# Patient Record
Sex: MALE | Race: WHITE | NOT HISPANIC OR LATINO | Employment: PART TIME | URBAN - METROPOLITAN AREA
[De-identification: names, ages, dates, MRNs, and addresses within clinical notes are randomized per-mention and may not be internally consistent; named-entity substitution may affect disease eponyms.]

---

## 2018-07-02 ENCOUNTER — HOSPITAL ENCOUNTER (INPATIENT)
Facility: HOSPITAL | Age: 28
LOS: 2 days | Discharge: HOME | End: 2018-07-04
Attending: EMERGENCY MEDICINE | Admitting: SURGERY

## 2018-07-02 ENCOUNTER — APPOINTMENT (OUTPATIENT)
Dept: ULTRASOUND IMAGING | Facility: HOSPITAL | Age: 28
End: 2018-07-02

## 2018-07-02 DIAGNOSIS — K81.9 CHOLECYSTITIS: Primary | ICD-10-CM

## 2018-07-02 LAB
ALBUMIN SERPL-MCNC: 4.8 G/DL (ref 3.5–5.2)
ALBUMIN/GLOB SERPL: 2 G/DL
ALP SERPL-CCNC: 59 U/L (ref 39–117)
ALT SERPL W P-5'-P-CCNC: 37 U/L (ref 1–41)
ANION GAP SERPL CALCULATED.3IONS-SCNC: 12.5 MMOL/L
AST SERPL-CCNC: 27 U/L (ref 1–40)
BASOPHILS # BLD AUTO: 0.05 10*3/MM3 (ref 0–0.2)
BASOPHILS NFR BLD AUTO: 0.4 % (ref 0–1.5)
BILIRUB SERPL-MCNC: 0.8 MG/DL (ref 0.1–1.2)
BILIRUB UR QL STRIP: NEGATIVE
BUN BLD-MCNC: 17 MG/DL (ref 6–20)
BUN/CREAT SERPL: 15.6 (ref 7–25)
CALCIUM SPEC-SCNC: 9 MG/DL (ref 8.6–10.5)
CHLORIDE SERPL-SCNC: 101 MMOL/L (ref 98–107)
CLARITY UR: ABNORMAL
CO2 SERPL-SCNC: 25.5 MMOL/L (ref 22–29)
COLOR UR: YELLOW
CREAT BLD-MCNC: 1.09 MG/DL (ref 0.76–1.27)
DEPRECATED RDW RBC AUTO: 40.7 FL (ref 37–54)
EOSINOPHIL # BLD AUTO: 0.69 10*3/MM3 (ref 0–0.7)
EOSINOPHIL NFR BLD AUTO: 5.9 % (ref 0.3–6.2)
ERYTHROCYTE [DISTWIDTH] IN BLOOD BY AUTOMATED COUNT: 12.1 % (ref 11.5–14.5)
GFR SERPL CREATININE-BSD FRML MDRD: 81 ML/MIN/1.73
GLOBULIN UR ELPH-MCNC: 2.4 GM/DL
GLUCOSE BLD-MCNC: 88 MG/DL (ref 65–99)
GLUCOSE UR STRIP-MCNC: NEGATIVE MG/DL
HCT VFR BLD AUTO: 47.7 % (ref 40.4–52.2)
HGB BLD-MCNC: 16.7 G/DL (ref 13.7–17.6)
HGB UR QL STRIP.AUTO: NEGATIVE
HOLD SPECIMEN: NORMAL
HOLD SPECIMEN: NORMAL
IMM GRANULOCYTES # BLD: 0.03 10*3/MM3 (ref 0–0.03)
IMM GRANULOCYTES NFR BLD: 0.3 % (ref 0–0.5)
KETONES UR QL STRIP: NEGATIVE
LEUKOCYTE ESTERASE UR QL STRIP.AUTO: NEGATIVE
LIPASE SERPL-CCNC: 21 U/L (ref 13–60)
LYMPHOCYTES # BLD AUTO: 1.81 10*3/MM3 (ref 0.9–4.8)
LYMPHOCYTES NFR BLD AUTO: 15.6 % (ref 19.6–45.3)
MCH RBC QN AUTO: 32.1 PG (ref 27–32.7)
MCHC RBC AUTO-ENTMCNC: 35 G/DL (ref 32.6–36.4)
MCV RBC AUTO: 91.6 FL (ref 79.8–96.2)
MONOCYTES # BLD AUTO: 0.89 10*3/MM3 (ref 0.2–1.2)
MONOCYTES NFR BLD AUTO: 7.7 % (ref 5–12)
NEUTROPHILS # BLD AUTO: 8.17 10*3/MM3 (ref 1.9–8.1)
NEUTROPHILS NFR BLD AUTO: 70.4 % (ref 42.7–76)
NITRITE UR QL STRIP: NEGATIVE
PH UR STRIP.AUTO: <=5 [PH] (ref 5–8)
PLATELET # BLD AUTO: 266 10*3/MM3 (ref 140–500)
PMV BLD AUTO: 9.8 FL (ref 6–12)
POTASSIUM BLD-SCNC: 4.2 MMOL/L (ref 3.5–5.2)
PROT SERPL-MCNC: 7.2 G/DL (ref 6–8.5)
PROT UR QL STRIP: NEGATIVE
RBC # BLD AUTO: 5.21 10*6/MM3 (ref 4.6–6)
SODIUM BLD-SCNC: 139 MMOL/L (ref 136–145)
SP GR UR STRIP: 1.03 (ref 1–1.03)
UROBILINOGEN UR QL STRIP: ABNORMAL
WBC NRBC COR # BLD: 11.61 10*3/MM3 (ref 4.5–10.7)
WHOLE BLOOD HOLD SPECIMEN: NORMAL
WHOLE BLOOD HOLD SPECIMEN: NORMAL

## 2018-07-02 PROCEDURE — 76705 ECHO EXAM OF ABDOMEN: CPT

## 2018-07-02 PROCEDURE — 99222 1ST HOSP IP/OBS MODERATE 55: CPT | Performed by: SURGERY

## 2018-07-02 PROCEDURE — 80053 COMPREHEN METABOLIC PANEL: CPT | Performed by: PHYSICIAN ASSISTANT

## 2018-07-02 PROCEDURE — 85025 COMPLETE CBC W/AUTO DIFF WBC: CPT | Performed by: PHYSICIAN ASSISTANT

## 2018-07-02 PROCEDURE — 99284 EMERGENCY DEPT VISIT MOD MDM: CPT

## 2018-07-02 PROCEDURE — 83690 ASSAY OF LIPASE: CPT | Performed by: PHYSICIAN ASSISTANT

## 2018-07-02 PROCEDURE — G0378 HOSPITAL OBSERVATION PER HR: HCPCS

## 2018-07-02 PROCEDURE — 25010000002 PIPERACILLIN SOD-TAZOBACTAM PER 1 G: Performed by: EMERGENCY MEDICINE

## 2018-07-02 PROCEDURE — 81003 URINALYSIS AUTO W/O SCOPE: CPT | Performed by: PHYSICIAN ASSISTANT

## 2018-07-02 RX ORDER — SODIUM CHLORIDE, SODIUM LACTATE, POTASSIUM CHLORIDE, CALCIUM CHLORIDE 600; 310; 30; 20 MG/100ML; MG/100ML; MG/100ML; MG/100ML
125 INJECTION, SOLUTION INTRAVENOUS CONTINUOUS
Status: DISCONTINUED | OUTPATIENT
Start: 2018-07-02 | End: 2018-07-03

## 2018-07-02 RX ORDER — ONDANSETRON 2 MG/ML
4 INJECTION INTRAMUSCULAR; INTRAVENOUS EVERY 6 HOURS PRN
Status: DISCONTINUED | OUTPATIENT
Start: 2018-07-02 | End: 2018-07-04 | Stop reason: HOSPADM

## 2018-07-02 RX ORDER — ALUMINA, MAGNESIA, AND SIMETHICONE 2400; 2400; 240 MG/30ML; MG/30ML; MG/30ML
15 SUSPENSION ORAL ONCE
Status: COMPLETED | OUTPATIENT
Start: 2018-07-02 | End: 2018-07-02

## 2018-07-02 RX ORDER — NALOXONE HCL 0.4 MG/ML
0.4 VIAL (ML) INJECTION
Status: DISCONTINUED | OUTPATIENT
Start: 2018-07-02 | End: 2018-07-04 | Stop reason: HOSPADM

## 2018-07-02 RX ORDER — SODIUM CHLORIDE 0.9 % (FLUSH) 0.9 %
1-10 SYRINGE (ML) INJECTION AS NEEDED
Status: DISCONTINUED | OUTPATIENT
Start: 2018-07-02 | End: 2018-07-04 | Stop reason: HOSPADM

## 2018-07-02 RX ORDER — ALUMINA, MAGNESIA, AND SIMETHICONE 2400; 2400; 240 MG/30ML; MG/30ML; MG/30ML
15 SUSPENSION ORAL ONCE
Status: DISCONTINUED | OUTPATIENT
Start: 2018-07-02 | End: 2018-07-02

## 2018-07-02 RX ADMIN — ALUMINUM HYDROXIDE, MAGNESIUM HYDROXIDE, AND DIMETHICONE 15 ML: 400; 400; 40 SUSPENSION ORAL at 14:18

## 2018-07-02 RX ADMIN — SODIUM CHLORIDE, POTASSIUM CHLORIDE, SODIUM LACTATE AND CALCIUM CHLORIDE 125 ML/HR: 600; 310; 30; 20 INJECTION, SOLUTION INTRAVENOUS at 20:14

## 2018-07-02 RX ADMIN — LIDOCAINE HYDROCHLORIDE 15 ML: 20 SOLUTION ORAL; TOPICAL at 14:18

## 2018-07-02 RX ADMIN — TAZOBACTAM SODIUM AND PIPERACILLIN SODIUM 4.5 G: 500; 4 INJECTION, SOLUTION INTRAVENOUS at 16:08

## 2018-07-02 NOTE — ED TRIAGE NOTES
Pt reports epigastric pain that began when he woke up at 7:30 this morning. He denies N/V/D. He reports a similar episode of abdominal pain last Monday.

## 2018-07-02 NOTE — H&P
Patient Care Team:  No Known Provider as PCP - General    Chief complaint:  Abdominal pain    Subjective     History of Present Illness     The patient is a very pleasant 28-year-old white male who presented to the emergency room with abdominal pain.  He developed fairly severe epigastric abdominal pain last night after eating hot dogs for dinner.  He had no nausea or vomiting.  He has not had any diarrhea or constipation.  He had similar symptoms about one week ago after eating pizza but those symptoms were more mild and resolved spontaneously.  About a year ago he had upper abdominal pain after eating bratwurst and was diagnosed with gas pain and sought no further medical attention.  He presented to the emergency room because his present symptoms were much more severe than previous symptoms and a right upper quadrant ultrasound showed gallstones with a thickened gallbladder wall and pericholecystic fluid consistent with acute cholecystitis.    Review of Systems   Constitutional: Negative for activity change, appetite change, fatigue and fever.   HENT: Negative for trouble swallowing and voice change.    Respiratory: Negative for chest tightness and shortness of breath.    Cardiovascular: Negative for chest pain and palpitations.   Gastrointestinal: Positive for abdominal pain. Negative for blood in stool, constipation, diarrhea, nausea and vomiting.   Endocrine: Negative for cold intolerance and heat intolerance.   Genitourinary: Negative for dysuria and flank pain.   Neurological: Negative for dizziness and light-headedness.   Hematological: Negative for adenopathy. Does not bruise/bleed easily.   Psychiatric/Behavioral: Negative for agitation and confusion.        History reviewed. No pertinent past medical history.  History reviewed. No pertinent surgical history.  History reviewed. No pertinent family history.  Social History   Substance Use Topics   • Smoking status: Never Smoker   • Smokeless tobacco:  Not on file   • Alcohol use Yes      Comment: twice weekly     Allergies:  Patient has no known allergies.    Objective      Vital Signs  Temp:  [97.7 °F (36.5 °C)-98.3 °F (36.8 °C)] 97.7 °F (36.5 °C)  Heart Rate:  [60-79] 67  Resp:  [16-20] 16  BP: (112-148)/(73-98) 112/76    Physical Exam   Constitutional: He is oriented to person, place, and time. He appears well-developed and well-nourished.  Non-toxic appearance. No distress.   HENT:   Head: Normocephalic and atraumatic.   Eyes: EOM are normal. No scleral icterus. Right eye exhibits normal extraocular motion. Left eye exhibits normal extraocular motion.   Neck: Normal range of motion. Neck supple. No JVD present. No tracheal deviation present.   Cardiovascular: Normal rate and regular rhythm.    Pulmonary/Chest: Effort normal and breath sounds normal. No respiratory distress. He exhibits no tenderness.   Abdominal: Soft. Normal appearance and bowel sounds are normal. He exhibits no distension. There is no hepatosplenomegaly. There is tenderness (Moderately tender) in the right upper quadrant. There is no rebound and no guarding. No hernia.   Neurological: He is alert and oriented to person, place, and time.   Skin: Skin is warm and dry.   Psychiatric: He has a normal mood and affect. His behavior is normal. Judgment and thought content normal.       Results Review:   I reviewed the patient's new clinical results.      Assessment/Plan     Active Problems:    Cholecystitis      Assessment & Plan     1.  Cholelithiasis with acute cholecystitis: Plan to admit and proceed with a laparoscopic cholecystectomy with intraoperative cholangiogram.  The patient understands the indications, alternatives, risks, and benefits of the procedure and wishes to proceed.    I discussed the patients findings and my recommendations with patient    Kofi Lora Jr., MD  07/02/18  6:58 PM

## 2018-07-02 NOTE — ED NOTES
Attempted to call report. Placed on hold for five minutes. Will attempt to call again in 15 minutes.      Mahnaz Fritz RN  07/02/18 5553

## 2018-07-02 NOTE — ED PROVIDER NOTES
" EMERGENCY DEPARTMENT ENCOUNTER    Room Number:  29/29  Date seen:  7/2/2018  Time seen: 2:26 PM  PCP: No Known Provider  Historian: Patient, Family      HPI:  Chief Complaint: Abdominal Pain  Context: Tyrone Carvajal is a 28 y.o. male who presents to the ED c/o constant epigastric abdominal pain onset earlier this AM. Pt states that his abdominal pain will worsen shortly after food intake. Pt reports that he has taken ibuprofen with improvement of his abdominal pain. Pt denies chest pain, dyspnea, vomiting, diarrhea, constipation, and dysuria. However, pt has had mild nausea. Pt reports that he had a similar episode of abdominal pain last week for which pt took ibuprofen, which resolved pt's abdominal pain transiently. There are no other complaints at this time.     Pain Location: Epigastrium  Radiation: None  Quality: \"aching\"  Intensity/Severity: Moderate  Duration: Onset this AM  Onset quality: Gradual in onset  Timing: Constant  Progression: Improving  Aggravating Factors: Food intake  Alleviating Factors: Ibuprofen  Previous Episodes: One episode last week  Treatment before arrival: Ibuprofen  Associated Symptoms: Nausea (mild)          PAST MEDICAL HISTORY  Active Ambulatory Problems     Diagnosis Date Noted   • No Active Ambulatory Problems     Resolved Ambulatory Problems     Diagnosis Date Noted   • No Resolved Ambulatory Problems     No Additional Past Medical History         PAST SURGICAL HISTORY  History reviewed. No pertinent surgical history.      FAMILY HISTORY  History reviewed. No pertinent family history.      SOCIAL HISTORY  Social History     Social History   • Marital status: Single     Spouse name: N/A   • Number of children: N/A   • Years of education: N/A     Occupational History   • Not on file.     Social History Main Topics   • Smoking status: Never Smoker   • Smokeless tobacco: Not on file   • Alcohol use Yes      Comment: twice weekly   • Drug use: No   • Sexual activity: Not on file "     Other Topics Concern   • Not on file     Social History Narrative   • No narrative on file         ALLERGIES  Patient has no known allergies.        REVIEW OF SYSTEMS  Review of Systems   Constitutional: Negative for chills.   HENT: Negative for congestion, rhinorrhea and sore throat.    Eyes: Negative for pain.   Respiratory: Negative for cough and shortness of breath.    Cardiovascular: Negative for chest pain, palpitations and leg swelling.   Gastrointestinal: Positive for abdominal pain (improving) and nausea (mild). Negative for constipation, diarrhea and vomiting.   Genitourinary: Negative for difficulty urinating, dysuria, flank pain and frequency.   Musculoskeletal: Negative for myalgias, neck pain and neck stiffness.   Skin: Negative for rash.   Neurological: Negative for dizziness, speech difficulty, weakness, light-headedness, numbness and headaches.   Psychiatric/Behavioral: Negative.    All other systems reviewed and are negative.           PHYSICAL EXAM  ED Triage Vitals   Temp Heart Rate Resp BP SpO2   07/02/18 1023 07/02/18 1023 07/02/18 1105 07/02/18 1105 07/02/18 1023   98.3 °F (36.8 °C) 79 18 148/90 99 %      Temp src Heart Rate Source Patient Position BP Location FiO2 (%)   07/02/18 1023 07/02/18 1421 07/02/18 1421 07/02/18 1421 --   Tympanic Monitor Lying Right arm        Physical Exam    GENERAL: not distressed  HENT: nares patent  EYES: no scleral icterus  CV: regular rhythm, regular rate  RESPIRATORY: normal effort. Lungs are CTAB.  ABDOMEN: soft. Epigastric tenderness.   MUSCULOSKELETAL: no deformity  NEURO: alert, AUGUSTE, FC  SKIN: warm, dry    Vital signs and nursing notes reviewed.        LAB RESULTS  Recent Results (from the past 24 hour(s))   Comprehensive Metabolic Panel    Collection Time: 07/02/18 11:17 AM   Result Value Ref Range    Glucose 88 65 - 99 mg/dL    BUN 17 6 - 20 mg/dL    Creatinine 1.09 0.76 - 1.27 mg/dL    Sodium 139 136 - 145 mmol/L    Potassium 4.2 3.5 - 5.2 mmol/L     Chloride 101 98 - 107 mmol/L    CO2 25.5 22.0 - 29.0 mmol/L    Calcium 9.0 8.6 - 10.5 mg/dL    Total Protein 7.2 6.0 - 8.5 g/dL    Albumin 4.80 3.50 - 5.20 g/dL    ALT (SGPT) 37 1 - 41 U/L    AST (SGOT) 27 1 - 40 U/L    Alkaline Phosphatase 59 39 - 117 U/L    Total Bilirubin 0.8 0.1 - 1.2 mg/dL    eGFR Non African Amer 81 >60 mL/min/1.73    Globulin 2.4 gm/dL    A/G Ratio 2.0 g/dL    BUN/Creatinine Ratio 15.6 7.0 - 25.0    Anion Gap 12.5 mmol/L   Lipase    Collection Time: 07/02/18 11:17 AM   Result Value Ref Range    Lipase 21 13 - 60 U/L   Urinalysis With Microscopic If Indicated (No Culture) - Urine, Clean Catch    Collection Time: 07/02/18 11:17 AM   Result Value Ref Range    Color, UA Yellow Yellow, Straw    Appearance, UA Turbid (A) Clear    pH, UA <=5.0 5.0 - 8.0    Specific Gravity, UA 1.026 1.005 - 1.030    Glucose, UA Negative Negative    Ketones, UA Negative Negative    Bilirubin, UA Negative Negative    Blood, UA Negative Negative    Protein, UA Negative Negative    Leuk Esterase, UA Negative Negative    Nitrite, UA Negative Negative    Urobilinogen, UA 1.0 E.U./dL 0.2 - 1.0 E.U./dL   CBC Auto Differential    Collection Time: 07/02/18 11:17 AM   Result Value Ref Range    WBC 11.61 (H) 4.50 - 10.70 10*3/mm3    RBC 5.21 4.60 - 6.00 10*6/mm3    Hemoglobin 16.7 13.7 - 17.6 g/dL    Hematocrit 47.7 40.4 - 52.2 %    MCV 91.6 79.8 - 96.2 fL    MCH 32.1 27.0 - 32.7 pg    MCHC 35.0 32.6 - 36.4 g/dL    RDW 12.1 11.5 - 14.5 %    RDW-SD 40.7 37.0 - 54.0 fl    MPV 9.8 6.0 - 12.0 fL    Platelets 266 140 - 500 10*3/mm3    Neutrophil % 70.4 42.7 - 76.0 %    Lymphocyte % 15.6 (L) 19.6 - 45.3 %    Monocyte % 7.7 5.0 - 12.0 %    Eosinophil % 5.9 0.3 - 6.2 %    Basophil % 0.4 0.0 - 1.5 %    Immature Grans % 0.3 0.0 - 0.5 %    Neutrophils, Absolute 8.17 (H) 1.90 - 8.10 10*3/mm3    Lymphocytes, Absolute 1.81 0.90 - 4.80 10*3/mm3    Monocytes, Absolute 0.89 0.20 - 1.20 10*3/mm3    Eosinophils, Absolute 0.69 0.00 - 0.70  10*3/mm3    Basophils, Absolute 0.05 0.00 - 0.20 10*3/mm3    Immature Grans, Absolute 0.03 0.00 - 0.03 10*3/mm3   Light Blue Top    Collection Time: 07/02/18 11:17 AM   Result Value Ref Range    Extra Tube hold for add-on    Green Top (Gel)    Collection Time: 07/02/18 11:17 AM   Result Value Ref Range    Extra Tube Hold for add-ons.    Lavender Top    Collection Time: 07/02/18 11:17 AM   Result Value Ref Range    Extra Tube hold for add-on    Gold Top - SST    Collection Time: 07/02/18 11:17 AM   Result Value Ref Range    Extra Tube Hold for add-ons.        Ordered the above labs and reviewed the results.        RADIOLOGY  US Gallbladder   Final Result       Cholelithiasis with evidence of acute cholecystitis.       Discussed by telephone with Juan Carlos Rojas at time of interpretation,   1503, 7/2/2018.       This report was finalized on 7/2/2018 3:06 PM by Dr. Guido Fontenot M.D.                 Ordered the above noted radiological studies. Reviewed by me in PACS.  Spoke with Dr. Fontenot (radiologist) regarding gallbladder US results.          PROCEDURES  Procedures        MEDICATIONS GIVEN IN ER  Medications   aluminum-magnesium hydroxide-simethicone (MAALOX MAX) 400-400-40 MG/5ML suspension 15 mL (15 mL Oral Given 7/2/18 1418)   lidocaine viscous (XYLOCAINE) 2 % mouth solution 15 mL (15 mL Mouth/Throat Given 7/2/18 1418)   piperacillin-tazobactam (ZOSYN) 4.5 g in iso-osmotic dextrose 100 mL IVPB (premix) (0 g Intravenous Stopped 7/2/18 1644)                   PROGRESS AND CONSULTS  ED Course as of Jul 02 1729   Mon Jul 02, 2018   1110 Pt reports awaking with severe epigastric pain this am, took ibuprofen and now much improved. 2/10 currently. Had similar episode 1 week ago that improved with muscle relaxer. States drank ETOH 2 days ago, no ETOH use prior to previous episodes. Infrequent use of NSAIDS. Denies fever, n/v/d, urinary symptoms.  Exam - no distress, RRR, lungs clear, abdomen soft with mild  epigastric and RUQ tenderness.  [KA]   1342 Rechecked patient. Pain still 2/10. Re-examined abdomen. Mild epigastric/RUQ tenderness. Offered GI cocktail, pt accepts.  [KA]      ED Course User Index  [KA] SANG Richards     2:32 PM:  Gallbladder US ordered for further evaluation. GI cocktail ordered to treat for abdominal discomfort.     3:06 PM:  Pt's gallbladder US shows cholecystitis - Zosyn ordered. Blood cultures ordered for further evaluation. Placed call to the general surgeon to discuss further course of care.     3:19 PM:  Rechecked pt. Informed pt that his gallbladder US shows acute cholecystitis. Need for pt's admission to the hospital for cholecystectomy. Pt agrees with plan. Decision time to admit: Now.     4:02 PM:  Discussed case with Dr. Lora, general surgeon. He will admit pt to a med/surg bed for cholecystectomy.         MEDICAL DECISION MAKING      MDM  Number of Diagnoses or Management Options  Cholecystitis:   Diagnosis management comments: Pt with cholecystitis. IV abx. Admit.        Amount and/or Complexity of Data Reviewed  Clinical lab tests: ordered and reviewed (WBC is 11.61.)  Tests in the radiology section of CPT®: ordered and reviewed (Gallbladder US: Cholelithiasis with evidence of acute cholecystitis.)  Discussion of test results with the performing providers: yes (Gallbladder US results d/w radiologist.   )  Discuss the patient with other providers: yes (Case d/w Dr. Lora, general surgeon, who will admit pt to a med/surg bed.   )    Patient Progress  Patient progress: stable             DIAGNOSIS  Final diagnoses:   Cholecystitis         DISPOSITION  Pt admitted to med/surg.      ADMISSION    Discussed treatment plan and reason for admission with pt/family and admitting physician.  Pt/family voiced understanding of the plan for admission for further testing/treatment as needed.                 Latest Documented Vital Signs:  As of 4:04 PM  BP- 126/73 HR- 60 Temp- 98.3 °F (36.8  °C) (Tympanic) O2 sat- 100%        --  Documentation assistance provided by tami Tapia for Dr. Bob MD.  Information recorded by the scribe was done at my direction and has been verified and validated by me.          Hilda Tapia  07/02/18 0780       Juan Carlos Rojas II, MD  07/02/18 [id number]

## 2018-07-03 ENCOUNTER — ANESTHESIA EVENT (OUTPATIENT)
Dept: PERIOP | Facility: HOSPITAL | Age: 28
End: 2018-07-03

## 2018-07-03 ENCOUNTER — APPOINTMENT (OUTPATIENT)
Dept: GENERAL RADIOLOGY | Facility: HOSPITAL | Age: 28
End: 2018-07-03

## 2018-07-03 ENCOUNTER — ANESTHESIA (OUTPATIENT)
Dept: PERIOP | Facility: HOSPITAL | Age: 28
End: 2018-07-03

## 2018-07-03 LAB
ANION GAP SERPL CALCULATED.3IONS-SCNC: 9 MMOL/L
BASOPHILS # BLD AUTO: 0.04 10*3/MM3 (ref 0–0.2)
BASOPHILS NFR BLD AUTO: 0.5 % (ref 0–1.5)
BUN BLD-MCNC: 9 MG/DL (ref 6–20)
BUN/CREAT SERPL: 8.7 (ref 7–25)
CALCIUM SPEC-SCNC: 8.7 MG/DL (ref 8.6–10.5)
CHLORIDE SERPL-SCNC: 102 MMOL/L (ref 98–107)
CO2 SERPL-SCNC: 30 MMOL/L (ref 22–29)
CREAT BLD-MCNC: 1.03 MG/DL (ref 0.76–1.27)
DEPRECATED RDW RBC AUTO: 41.5 FL (ref 37–54)
EOSINOPHIL # BLD AUTO: 0.65 10*3/MM3 (ref 0–0.7)
EOSINOPHIL NFR BLD AUTO: 8 % (ref 0.3–6.2)
ERYTHROCYTE [DISTWIDTH] IN BLOOD BY AUTOMATED COUNT: 12.2 % (ref 11.5–14.5)
GFR SERPL CREATININE-BSD FRML MDRD: 87 ML/MIN/1.73
GLUCOSE BLD-MCNC: 86 MG/DL (ref 65–99)
HCT VFR BLD AUTO: 50.6 % (ref 40.4–52.2)
HGB BLD-MCNC: 17.1 G/DL (ref 13.7–17.6)
IMM GRANULOCYTES # BLD: 0.03 10*3/MM3 (ref 0–0.03)
IMM GRANULOCYTES NFR BLD: 0.4 % (ref 0–0.5)
LYMPHOCYTES # BLD AUTO: 2.53 10*3/MM3 (ref 0.9–4.8)
LYMPHOCYTES NFR BLD AUTO: 31 % (ref 19.6–45.3)
MCH RBC QN AUTO: 31.6 PG (ref 27–32.7)
MCHC RBC AUTO-ENTMCNC: 33.8 G/DL (ref 32.6–36.4)
MCV RBC AUTO: 93.5 FL (ref 79.8–96.2)
MONOCYTES # BLD AUTO: 0.46 10*3/MM3 (ref 0.2–1.2)
MONOCYTES NFR BLD AUTO: 5.6 % (ref 5–12)
NEUTROPHILS # BLD AUTO: 4.45 10*3/MM3 (ref 1.9–8.1)
NEUTROPHILS NFR BLD AUTO: 54.5 % (ref 42.7–76)
PLATELET # BLD AUTO: 247 10*3/MM3 (ref 140–500)
PMV BLD AUTO: 9.7 FL (ref 6–12)
POTASSIUM BLD-SCNC: 4 MMOL/L (ref 3.5–5.2)
RBC # BLD AUTO: 5.41 10*6/MM3 (ref 4.6–6)
SODIUM BLD-SCNC: 141 MMOL/L (ref 136–145)
WBC NRBC COR # BLD: 8.16 10*3/MM3 (ref 4.5–10.7)

## 2018-07-03 PROCEDURE — 80048 BASIC METABOLIC PNL TOTAL CA: CPT | Performed by: SURGERY

## 2018-07-03 PROCEDURE — 47563 LAPARO CHOLECYSTECTOMY/GRAPH: CPT | Performed by: SURGERY

## 2018-07-03 PROCEDURE — 25010000002 SUCCINYLCHOLINE PER 20 MG: Performed by: NURSE ANESTHETIST, CERTIFIED REGISTERED

## 2018-07-03 PROCEDURE — 94799 UNLISTED PULMONARY SVC/PX: CPT

## 2018-07-03 PROCEDURE — 25010000002 FENTANYL CITRATE (PF) 100 MCG/2ML SOLUTION: Performed by: NURSE ANESTHETIST, CERTIFIED REGISTERED

## 2018-07-03 PROCEDURE — 0FT44ZZ RESECTION OF GALLBLADDER, PERCUTANEOUS ENDOSCOPIC APPROACH: ICD-10-PCS | Performed by: SURGERY

## 2018-07-03 PROCEDURE — 25010000002 DEXAMETHASONE PER 1 MG: Performed by: NURSE ANESTHETIST, CERTIFIED REGISTERED

## 2018-07-03 PROCEDURE — 25010000002 MIDAZOLAM PER 1 MG: Performed by: ANESTHESIOLOGY

## 2018-07-03 PROCEDURE — 0 IOTHALAMATE 60 % SOLUTION: Performed by: SURGERY

## 2018-07-03 PROCEDURE — 25010000002 KETOROLAC TROMETHAMINE PER 15 MG: Performed by: NURSE ANESTHETIST, CERTIFIED REGISTERED

## 2018-07-03 PROCEDURE — BF101ZZ FLUOROSCOPY OF BILE DUCTS USING LOW OSMOLAR CONTRAST: ICD-10-PCS | Performed by: SURGERY

## 2018-07-03 PROCEDURE — 74300 X-RAY BILE DUCTS/PANCREAS: CPT

## 2018-07-03 PROCEDURE — 85025 COMPLETE CBC W/AUTO DIFF WBC: CPT | Performed by: SURGERY

## 2018-07-03 PROCEDURE — 88304 TISSUE EXAM BY PATHOLOGIST: CPT | Performed by: SURGERY

## 2018-07-03 PROCEDURE — 25010000002 ONDANSETRON PER 1 MG: Performed by: NURSE ANESTHETIST, CERTIFIED REGISTERED

## 2018-07-03 PROCEDURE — 25010000002 PROPOFOL 10 MG/ML EMULSION: Performed by: NURSE ANESTHETIST, CERTIFIED REGISTERED

## 2018-07-03 RX ORDER — OXYCODONE AND ACETAMINOPHEN 7.5; 325 MG/1; MG/1
1 TABLET ORAL ONCE AS NEEDED
Status: DISCONTINUED | OUTPATIENT
Start: 2018-07-03 | End: 2018-07-03 | Stop reason: HOSPADM

## 2018-07-03 RX ORDER — GLYCOPYRROLATE 0.2 MG/ML
INJECTION INTRAMUSCULAR; INTRAVENOUS AS NEEDED
Status: DISCONTINUED | OUTPATIENT
Start: 2018-07-03 | End: 2018-07-03 | Stop reason: SURG

## 2018-07-03 RX ORDER — PROMETHAZINE HYDROCHLORIDE 25 MG/1
25 SUPPOSITORY RECTAL ONCE AS NEEDED
Status: DISCONTINUED | OUTPATIENT
Start: 2018-07-03 | End: 2018-07-03 | Stop reason: HOSPADM

## 2018-07-03 RX ORDER — ALBUTEROL SULFATE 2.5 MG/3ML
2.5 SOLUTION RESPIRATORY (INHALATION) ONCE AS NEEDED
Status: DISCONTINUED | OUTPATIENT
Start: 2018-07-03 | End: 2018-07-03 | Stop reason: HOSPADM

## 2018-07-03 RX ORDER — PROMETHAZINE HYDROCHLORIDE 25 MG/ML
12.5 INJECTION, SOLUTION INTRAMUSCULAR; INTRAVENOUS ONCE AS NEEDED
Status: DISCONTINUED | OUTPATIENT
Start: 2018-07-03 | End: 2018-07-03 | Stop reason: HOSPADM

## 2018-07-03 RX ORDER — MAGNESIUM HYDROXIDE 1200 MG/15ML
LIQUID ORAL AS NEEDED
Status: DISCONTINUED | OUTPATIENT
Start: 2018-07-03 | End: 2018-07-03 | Stop reason: HOSPADM

## 2018-07-03 RX ORDER — MIDAZOLAM HYDROCHLORIDE 1 MG/ML
1 INJECTION INTRAMUSCULAR; INTRAVENOUS
Status: DISCONTINUED | OUTPATIENT
Start: 2018-07-03 | End: 2018-07-03 | Stop reason: HOSPADM

## 2018-07-03 RX ORDER — MIDAZOLAM HYDROCHLORIDE 1 MG/ML
2 INJECTION INTRAMUSCULAR; INTRAVENOUS
Status: DISCONTINUED | OUTPATIENT
Start: 2018-07-03 | End: 2018-07-03 | Stop reason: HOSPADM

## 2018-07-03 RX ORDER — SUCCINYLCHOLINE CHLORIDE 20 MG/ML
INJECTION INTRAMUSCULAR; INTRAVENOUS AS NEEDED
Status: DISCONTINUED | OUTPATIENT
Start: 2018-07-03 | End: 2018-07-03 | Stop reason: SURG

## 2018-07-03 RX ORDER — KETOROLAC TROMETHAMINE 30 MG/ML
INJECTION, SOLUTION INTRAMUSCULAR; INTRAVENOUS AS NEEDED
Status: DISCONTINUED | OUTPATIENT
Start: 2018-07-03 | End: 2018-07-03 | Stop reason: SURG

## 2018-07-03 RX ORDER — FENTANYL CITRATE 50 UG/ML
INJECTION, SOLUTION INTRAMUSCULAR; INTRAVENOUS AS NEEDED
Status: DISCONTINUED | OUTPATIENT
Start: 2018-07-03 | End: 2018-07-03 | Stop reason: SURG

## 2018-07-03 RX ORDER — PROMETHAZINE HYDROCHLORIDE 25 MG/1
12.5 TABLET ORAL ONCE AS NEEDED
Status: DISCONTINUED | OUTPATIENT
Start: 2018-07-03 | End: 2018-07-03 | Stop reason: HOSPADM

## 2018-07-03 RX ORDER — FLUMAZENIL 0.1 MG/ML
0.2 INJECTION INTRAVENOUS AS NEEDED
Status: DISCONTINUED | OUTPATIENT
Start: 2018-07-03 | End: 2018-07-03 | Stop reason: HOSPADM

## 2018-07-03 RX ORDER — LABETALOL HYDROCHLORIDE 5 MG/ML
5 INJECTION, SOLUTION INTRAVENOUS
Status: DISCONTINUED | OUTPATIENT
Start: 2018-07-03 | End: 2018-07-03 | Stop reason: HOSPADM

## 2018-07-03 RX ORDER — ROCURONIUM BROMIDE 10 MG/ML
INJECTION, SOLUTION INTRAVENOUS AS NEEDED
Status: DISCONTINUED | OUTPATIENT
Start: 2018-07-03 | End: 2018-07-03 | Stop reason: SURG

## 2018-07-03 RX ORDER — LIDOCAINE HYDROCHLORIDE 20 MG/ML
INJECTION, SOLUTION INFILTRATION; PERINEURAL AS NEEDED
Status: DISCONTINUED | OUTPATIENT
Start: 2018-07-03 | End: 2018-07-03 | Stop reason: SURG

## 2018-07-03 RX ORDER — BUPIVACAINE HYDROCHLORIDE AND EPINEPHRINE 5; 5 MG/ML; UG/ML
INJECTION, SOLUTION PERINEURAL AS NEEDED
Status: DISCONTINUED | OUTPATIENT
Start: 2018-07-03 | End: 2018-07-03 | Stop reason: HOSPADM

## 2018-07-03 RX ORDER — DEXAMETHASONE SODIUM PHOSPHATE 10 MG/ML
INJECTION INTRAMUSCULAR; INTRAVENOUS AS NEEDED
Status: DISCONTINUED | OUTPATIENT
Start: 2018-07-03 | End: 2018-07-03 | Stop reason: SURG

## 2018-07-03 RX ORDER — MEPERIDINE HYDROCHLORIDE 25 MG/ML
12.5 INJECTION INTRAMUSCULAR; INTRAVENOUS; SUBCUTANEOUS
Status: DISCONTINUED | OUTPATIENT
Start: 2018-07-03 | End: 2018-07-03 | Stop reason: HOSPADM

## 2018-07-03 RX ORDER — FENTANYL CITRATE 50 UG/ML
50 INJECTION, SOLUTION INTRAMUSCULAR; INTRAVENOUS
Status: DISCONTINUED | OUTPATIENT
Start: 2018-07-03 | End: 2018-07-03 | Stop reason: HOSPADM

## 2018-07-03 RX ORDER — OXYCODONE HYDROCHLORIDE AND ACETAMINOPHEN 5; 325 MG/1; MG/1
2 TABLET ORAL EVERY 4 HOURS PRN
Status: DISCONTINUED | OUTPATIENT
Start: 2018-07-03 | End: 2018-07-04 | Stop reason: HOSPADM

## 2018-07-03 RX ORDER — ONDANSETRON 2 MG/ML
INJECTION INTRAMUSCULAR; INTRAVENOUS AS NEEDED
Status: DISCONTINUED | OUTPATIENT
Start: 2018-07-03 | End: 2018-07-03 | Stop reason: SURG

## 2018-07-03 RX ORDER — ONDANSETRON 2 MG/ML
4 INJECTION INTRAMUSCULAR; INTRAVENOUS ONCE AS NEEDED
Status: DISCONTINUED | OUTPATIENT
Start: 2018-07-03 | End: 2018-07-03 | Stop reason: HOSPADM

## 2018-07-03 RX ORDER — LIDOCAINE HYDROCHLORIDE 10 MG/ML
0.5 INJECTION, SOLUTION EPIDURAL; INFILTRATION; INTRACAUDAL; PERINEURAL ONCE AS NEEDED
Status: DISCONTINUED | OUTPATIENT
Start: 2018-07-03 | End: 2018-07-03 | Stop reason: HOSPADM

## 2018-07-03 RX ORDER — HYDROCODONE BITARTRATE AND ACETAMINOPHEN 7.5; 325 MG/1; MG/1
1 TABLET ORAL ONCE AS NEEDED
Status: DISCONTINUED | OUTPATIENT
Start: 2018-07-03 | End: 2018-07-03 | Stop reason: HOSPADM

## 2018-07-03 RX ORDER — DIPHENHYDRAMINE HYDROCHLORIDE 50 MG/ML
12.5 INJECTION INTRAMUSCULAR; INTRAVENOUS
Status: DISCONTINUED | OUTPATIENT
Start: 2018-07-03 | End: 2018-07-03 | Stop reason: HOSPADM

## 2018-07-03 RX ORDER — EPHEDRINE SULFATE 50 MG/ML
5 INJECTION, SOLUTION INTRAVENOUS ONCE AS NEEDED
Status: DISCONTINUED | OUTPATIENT
Start: 2018-07-03 | End: 2018-07-03 | Stop reason: HOSPADM

## 2018-07-03 RX ORDER — SODIUM CHLORIDE 9 MG/ML
INJECTION, SOLUTION INTRAVENOUS AS NEEDED
Status: DISCONTINUED | OUTPATIENT
Start: 2018-07-03 | End: 2018-07-03 | Stop reason: HOSPADM

## 2018-07-03 RX ORDER — SODIUM CHLORIDE, SODIUM LACTATE, POTASSIUM CHLORIDE, CALCIUM CHLORIDE 600; 310; 30; 20 MG/100ML; MG/100ML; MG/100ML; MG/100ML
9 INJECTION, SOLUTION INTRAVENOUS CONTINUOUS
Status: DISCONTINUED | OUTPATIENT
Start: 2018-07-03 | End: 2018-07-04 | Stop reason: HOSPADM

## 2018-07-03 RX ORDER — SODIUM CHLORIDE 0.9 % (FLUSH) 0.9 %
1-10 SYRINGE (ML) INJECTION AS NEEDED
Status: DISCONTINUED | OUTPATIENT
Start: 2018-07-03 | End: 2018-07-03 | Stop reason: HOSPADM

## 2018-07-03 RX ORDER — NALOXONE HCL 0.4 MG/ML
0.2 VIAL (ML) INJECTION AS NEEDED
Status: DISCONTINUED | OUTPATIENT
Start: 2018-07-03 | End: 2018-07-03 | Stop reason: HOSPADM

## 2018-07-03 RX ORDER — PROPOFOL 10 MG/ML
VIAL (ML) INTRAVENOUS AS NEEDED
Status: DISCONTINUED | OUTPATIENT
Start: 2018-07-03 | End: 2018-07-03 | Stop reason: SURG

## 2018-07-03 RX ORDER — FAMOTIDINE 10 MG/ML
20 INJECTION, SOLUTION INTRAVENOUS ONCE
Status: COMPLETED | OUTPATIENT
Start: 2018-07-03 | End: 2018-07-03

## 2018-07-03 RX ORDER — PROMETHAZINE HYDROCHLORIDE 25 MG/1
25 TABLET ORAL ONCE AS NEEDED
Status: DISCONTINUED | OUTPATIENT
Start: 2018-07-03 | End: 2018-07-03 | Stop reason: HOSPADM

## 2018-07-03 RX ADMIN — FAMOTIDINE 20 MG: 10 INJECTION, SOLUTION INTRAVENOUS at 12:34

## 2018-07-03 RX ADMIN — SUGAMMADEX 200 MG: 100 INJECTION, SOLUTION INTRAVENOUS at 14:11

## 2018-07-03 RX ADMIN — MIDAZOLAM 1 MG: 1 INJECTION INTRAMUSCULAR; INTRAVENOUS at 12:34

## 2018-07-03 RX ADMIN — ROCURONIUM BROMIDE 5 MG: 10 INJECTION INTRAVENOUS at 13:06

## 2018-07-03 RX ADMIN — KETOROLAC TROMETHAMINE 30 MG: 30 INJECTION, SOLUTION INTRAMUSCULAR; INTRAVENOUS at 13:38

## 2018-07-03 RX ADMIN — FENTANYL CITRATE 50 MCG: 50 INJECTION INTRAMUSCULAR; INTRAVENOUS at 13:04

## 2018-07-03 RX ADMIN — LIDOCAINE HYDROCHLORIDE 50 MG: 20 INJECTION, SOLUTION INFILTRATION; PERINEURAL at 13:06

## 2018-07-03 RX ADMIN — SUCCINYLCHOLINE CHLORIDE 100 MG: 20 INJECTION, SOLUTION INTRAMUSCULAR; INTRAVENOUS; PARENTERAL at 13:06

## 2018-07-03 RX ADMIN — SODIUM CHLORIDE, POTASSIUM CHLORIDE, SODIUM LACTATE AND CALCIUM CHLORIDE 9 ML/HR: 600; 310; 30; 20 INJECTION, SOLUTION INTRAVENOUS at 12:33

## 2018-07-03 RX ADMIN — DEXAMETHASONE SODIUM PHOSPHATE 8 MG: 10 INJECTION INTRAMUSCULAR; INTRAVENOUS at 13:37

## 2018-07-03 RX ADMIN — ONDANSETRON 4 MG: 2 INJECTION INTRAMUSCULAR; INTRAVENOUS at 13:39

## 2018-07-03 RX ADMIN — FENTANYL CITRATE 50 MCG: 50 INJECTION INTRAMUSCULAR; INTRAVENOUS at 13:34

## 2018-07-03 RX ADMIN — FENTANYL CITRATE 50 MCG: 50 INJECTION INTRAMUSCULAR; INTRAVENOUS at 13:26

## 2018-07-03 RX ADMIN — GLYCOPYRROLATE 0.2 MG: 0.2 INJECTION INTRAMUSCULAR; INTRAVENOUS at 13:04

## 2018-07-03 RX ADMIN — OXYCODONE HYDROCHLORIDE AND ACETAMINOPHEN 2 TABLET: 5; 325 TABLET ORAL at 15:21

## 2018-07-03 RX ADMIN — ROCURONIUM BROMIDE 25 MG: 10 INJECTION INTRAVENOUS at 13:13

## 2018-07-03 RX ADMIN — PROPOFOL 200 MG: 10 INJECTION, EMULSION INTRAVENOUS at 13:06

## 2018-07-03 RX ADMIN — SODIUM CHLORIDE, POTASSIUM CHLORIDE, SODIUM LACTATE AND CALCIUM CHLORIDE 125 ML/HR: 600; 310; 30; 20 INJECTION, SOLUTION INTRAVENOUS at 04:04

## 2018-07-03 NOTE — PAYOR COMM NOTE
"Bacilio Wilks  (28 y.o. Male)     ATTN: NURSE REVIEW    REF#[id number]    PLEASE CALL BACK TO ROSA MARIA [phone] OR [phone]  THANKS!   ROSA MARIA      Date of Birth Social Security Number Address Home Phone MRN    1990  [id number] [address] [phone] [id number]    Sikhism Marital Status          None Single       Admission Date Admission Type Admitting Provider Attending Provider Department, Room/Bed    7/2/18 Emergency Kofi Lora Jr., MD George, Anthony Jr., MD [facility] MAIN OR, [facility] Main OR/MAIN OR    Discharge Date Discharge Disposition Discharge Destination                       Attending Provider:  Kofi Lora Jr., MD    Allergies:  No Known Allergies    Isolation:  None   Infection:  None   Code Status:  CPR    Ht:  177.8 cm (70\")   Wt:  74.8 kg (164 lb 14.5 oz)    Admission Cmt:  None   Principal Problem:  None                Active Insurance as of 7/2/2018     Primary Coverage     Payor Plan Insurance Group Employer/Plan Group    [facility] [id number]     Payor Plan Address Payor Plan Phone Number Effective From Effective To    [address] [phone] 7/1/2017     [place] [address]       Subscriber Name Subscriber Birth Date Member ID       BACILIO WILKS 1990 [id number]                 Emergency Contacts      (Rel.) Home Phone Work Phone Mobile Phone    Santiago Kim (Grandparent) [phone] -- --               History & Physical      Kofi Lora Jr., MD at 7/2/2018  6:58 PM                Patient Care Team:  No Known Provider as PCP - General    Chief complaint:  Abdominal pain    Subjective     History of Present Illness     The patient is a very pleasant 28-year-old white male who presented to the emergency room with abdominal pain.  He developed fairly severe epigastric abdominal pain last night after eating hot dogs for dinner.  He had no nausea or vomiting.  He has not " had any diarrhea or constipation.  He had similar symptoms about one week ago after eating pizza but those symptoms were more mild and resolved spontaneously.  About a year ago he had upper abdominal pain after eating bratwurst and was diagnosed with gas pain and sought no further medical attention.  He presented to the emergency room because his present symptoms were much more severe than previous symptoms and a right upper quadrant ultrasound showed gallstones with a thickened gallbladder wall and pericholecystic fluid consistent with acute cholecystitis.    Review of Systems   Constitutional: Negative for activity change, appetite change, fatigue and fever.   HENT: Negative for trouble swallowing and voice change.    Respiratory: Negative for chest tightness and shortness of breath.    Cardiovascular: Negative for chest pain and palpitations.   Gastrointestinal: Positive for abdominal pain. Negative for blood in stool, constipation, diarrhea, nausea and vomiting.   Endocrine: Negative for cold intolerance and heat intolerance.   Genitourinary: Negative for dysuria and flank pain.   Neurological: Negative for dizziness and light-headedness.   Hematological: Negative for adenopathy. Does not bruise/bleed easily.   Psychiatric/Behavioral: Negative for agitation and confusion.        History reviewed. No pertinent past medical history.  History reviewed. No pertinent surgical history.  History reviewed. No pertinent family history.  Social History   Substance Use Topics   • Smoking status: Never Smoker   • Smokeless tobacco: Not on file   • Alcohol use Yes      Comment: twice weekly     Allergies:  Patient has no known allergies.    Objective      Vital Signs  Temp:  [97.7 °F (36.5 °C)-98.3 °F (36.8 °C)] 97.7 °F (36.5 °C)  Heart Rate:  [60-79] 67  Resp:  [16-20] 16  BP: (112-148)/(73-98) 112/76    Physical Exam   Constitutional: He is oriented to person, place, and time. He appears well-developed and well-nourished.   Non-toxic appearance. No distress.   HENT:   Head: Normocephalic and atraumatic.   Eyes: EOM are normal. No scleral icterus. Right eye exhibits normal extraocular motion. Left eye exhibits normal extraocular motion.   Neck: Normal range of motion. Neck supple. No JVD present. No tracheal deviation present.   Cardiovascular: Normal rate and regular rhythm.    Pulmonary/Chest: Effort normal and breath sounds normal. No respiratory distress. He exhibits no tenderness.   Abdominal: Soft. Normal appearance and bowel sounds are normal. He exhibits no distension. There is no hepatosplenomegaly. There is tenderness (Moderately tender) in the right upper quadrant. There is no rebound and no guarding. No hernia.   Neurological: He is alert and oriented to person, place, and time.   Skin: Skin is warm and dry.   Psychiatric: He has a normal mood and affect. His behavior is normal. Judgment and thought content normal.       Results Review:   I reviewed the patient's new clinical results.      Assessment/Plan     Active Problems:    Cholecystitis      Assessment & Plan     1.  Cholelithiasis with acute cholecystitis: Plan to admit and proceed with a laparoscopic cholecystectomy with intraoperative cholangiogram.  The patient understands the indications, alternatives, risks, and benefits of the procedure and wishes to proceed.    I discussed the patients findings and my recommendations with patient    Kofi Lora Jr., MD  07/02/18  6:58 PM        Electronically signed by Kofi Lora Jr., MD at 7/2/2018  7:03 PM          Emergency Department Notes      Roselyn Chin RN at 7/2/2018 11:04 AM        Pt reports epigastric pain that began when he woke up at 7:30 this morning. He denies N/V/D. He reports a similar episode of abdominal pain last Monday.    Electronically signed by Roselyn Chin RN at 7/2/2018 11:07 AM     Juan Carlos Rojas II, MD at 7/2/2018  2:26 PM           EMERGENCY DEPARTMENT ENCOUNTER    Room Number:   "29/29  Date seen:  7/2/2018  Time seen: 2:26 PM  PCP: No Known Provider  Historian: Patient, Family      HPI:  Chief Complaint: Abdominal Pain  Context: Tyrone Carvajal is a 28 y.o. male who presents to the ED c/o constant epigastric abdominal pain onset earlier this AM. Pt states that his abdominal pain will worsen shortly after food intake. Pt reports that he has taken ibuprofen with improvement of his abdominal pain. Pt denies chest pain, dyspnea, vomiting, diarrhea, constipation, and dysuria. However, pt has had mild nausea. Pt reports that he had a similar episode of abdominal pain last week for which pt took ibuprofen, which resolved pt's abdominal pain transiently. There are no other complaints at this time.     Pain Location: Epigastrium  Radiation: None  Quality: \"aching\"  Intensity/Severity: Moderate  Duration: Onset this AM  Onset quality: Gradual in onset  Timing: Constant  Progression: Improving  Aggravating Factors: Food intake  Alleviating Factors: Ibuprofen  Previous Episodes: One episode last week  Treatment before arrival: Ibuprofen  Associated Symptoms: Nausea (mild)          PAST MEDICAL HISTORY  Active Ambulatory Problems     Diagnosis Date Noted   • No Active Ambulatory Problems     Resolved Ambulatory Problems     Diagnosis Date Noted   • No Resolved Ambulatory Problems     No Additional Past Medical History         PAST SURGICAL HISTORY  History reviewed. No pertinent surgical history.      FAMILY HISTORY  History reviewed. No pertinent family history.      SOCIAL HISTORY  Social History     Social History   • Marital status: Single     Spouse name: N/A   • Number of children: N/A   • Years of education: N/A     Occupational History   • Not on file.     Social History Main Topics   • Smoking status: Never Smoker   • Smokeless tobacco: Not on file   • Alcohol use Yes      Comment: twice weekly   • Drug use: No   • Sexual activity: Not on file     Other Topics Concern   • Not on file     Social " History Narrative   • No narrative on file         ALLERGIES  Patient has no known allergies.        REVIEW OF SYSTEMS  Review of Systems   Constitutional: Negative for chills.   HENT: Negative for congestion, rhinorrhea and sore throat.    Eyes: Negative for pain.   Respiratory: Negative for cough and shortness of breath.    Cardiovascular: Negative for chest pain, palpitations and leg swelling.   Gastrointestinal: Positive for abdominal pain (improving) and nausea (mild). Negative for constipation, diarrhea and vomiting.   Genitourinary: Negative for difficulty urinating, dysuria, flank pain and frequency.   Musculoskeletal: Negative for myalgias, neck pain and neck stiffness.   Skin: Negative for rash.   Neurological: Negative for dizziness, speech difficulty, weakness, light-headedness, numbness and headaches.   Psychiatric/Behavioral: Negative.    All other systems reviewed and are negative.           PHYSICAL EXAM  ED Triage Vitals   Temp Heart Rate Resp BP SpO2   07/02/18 1023 07/02/18 1023 07/02/18 1105 07/02/18 1105 07/02/18 1023   98.3 °F (36.8 °C) 79 18 148/90 99 %      Temp src Heart Rate Source Patient Position BP Location FiO2 (%)   07/02/18 1023 07/02/18 1421 07/02/18 1421 07/02/18 1421 --   Tympanic Monitor Lying Right arm        Physical Exam    GENERAL: not distressed  HENT: nares patent  EYES: no scleral icterus  CV: regular rhythm, regular rate  RESPIRATORY: normal effort. Lungs are CTAB.  ABDOMEN: soft. Epigastric tenderness.   MUSCULOSKELETAL: no deformity  NEURO: alert, AUGUSTE, FC  SKIN: warm, dry    Vital signs and nursing notes reviewed.        LAB RESULTS  Recent Results (from the past 24 hour(s))   Comprehensive Metabolic Panel    Collection Time: 07/02/18 11:17 AM   Result Value Ref Range    Glucose 88 65 - 99 mg/dL    BUN 17 6 - 20 mg/dL    Creatinine 1.09 0.76 - 1.27 mg/dL    Sodium 139 136 - 145 mmol/L    Potassium 4.2 3.5 - 5.2 mmol/L    Chloride 101 98 - 107 mmol/L    CO2 25.5 22.0 -  29.0 mmol/L    Calcium 9.0 8.6 - 10.5 mg/dL    Total Protein 7.2 6.0 - 8.5 g/dL    Albumin 4.80 3.50 - 5.20 g/dL    ALT (SGPT) 37 1 - 41 U/L    AST (SGOT) 27 1 - 40 U/L    Alkaline Phosphatase 59 39 - 117 U/L    Total Bilirubin 0.8 0.1 - 1.2 mg/dL    eGFR Non African Amer 81 >60 mL/min/1.73    Globulin 2.4 gm/dL    A/G Ratio 2.0 g/dL    BUN/Creatinine Ratio 15.6 7.0 - 25.0    Anion Gap 12.5 mmol/L   Lipase    Collection Time: 07/02/18 11:17 AM   Result Value Ref Range    Lipase 21 13 - 60 U/L   Urinalysis With Microscopic If Indicated (No Culture) - Urine, Clean Catch    Collection Time: 07/02/18 11:17 AM   Result Value Ref Range    Color, UA Yellow Yellow, Straw    Appearance, UA Turbid (A) Clear    pH, UA <=5.0 5.0 - 8.0    Specific Gravity, UA 1.026 1.005 - 1.030    Glucose, UA Negative Negative    Ketones, UA Negative Negative    Bilirubin, UA Negative Negative    Blood, UA Negative Negative    Protein, UA Negative Negative    Leuk Esterase, UA Negative Negative    Nitrite, UA Negative Negative    Urobilinogen, UA 1.0 E.U./dL 0.2 - 1.0 E.U./dL   CBC Auto Differential    Collection Time: 07/02/18 11:17 AM   Result Value Ref Range    WBC 11.61 (H) 4.50 - 10.70 10*3/mm3    RBC 5.21 4.60 - 6.00 10*6/mm3    Hemoglobin 16.7 13.7 - 17.6 g/dL    Hematocrit 47.7 40.4 - 52.2 %    MCV 91.6 79.8 - 96.2 fL    MCH 32.1 27.0 - 32.7 pg    MCHC 35.0 32.6 - 36.4 g/dL    RDW 12.1 11.5 - 14.5 %    RDW-SD 40.7 37.0 - 54.0 fl    MPV 9.8 6.0 - 12.0 fL    Platelets 266 140 - 500 10*3/mm3    Neutrophil % 70.4 42.7 - 76.0 %    Lymphocyte % 15.6 (L) 19.6 - 45.3 %    Monocyte % 7.7 5.0 - 12.0 %    Eosinophil % 5.9 0.3 - 6.2 %    Basophil % 0.4 0.0 - 1.5 %    Immature Grans % 0.3 0.0 - 0.5 %    Neutrophils, Absolute 8.17 (H) 1.90 - 8.10 10*3/mm3    Lymphocytes, Absolute 1.81 0.90 - 4.80 10*3/mm3    Monocytes, Absolute 0.89 0.20 - 1.20 10*3/mm3    Eosinophils, Absolute 0.69 0.00 - 0.70 10*3/mm3    Basophils, Absolute 0.05 0.00 - 0.20  10*3/mm3    Immature Grans, Absolute 0.03 0.00 - 0.03 10*3/mm3   Light Blue Top    Collection Time: 07/02/18 11:17 AM   Result Value Ref Range    Extra Tube hold for add-on    Green Top (Gel)    Collection Time: 07/02/18 11:17 AM   Result Value Ref Range    Extra Tube Hold for add-ons.    Lavender Top    Collection Time: 07/02/18 11:17 AM   Result Value Ref Range    Extra Tube hold for add-on    Gold Top - SST    Collection Time: 07/02/18 11:17 AM   Result Value Ref Range    Extra Tube Hold for add-ons.        Ordered the above labs and reviewed the results.        RADIOLOGY  US Gallbladder   Final Result       Cholelithiasis with evidence of acute cholecystitis.       Discussed by telephone with Juan Carlos Rojas at time of interpretation,   1503, 7/2/2018.       This report was finalized on 7/2/2018 3:06 PM by Dr. Guido Fontenot M.D.                 Ordered the above noted radiological studies. Reviewed by me in PACS.  Spoke with Dr. Fontenot (radiologist) regarding gallbladder US results.          PROCEDURES  Procedures        MEDICATIONS GIVEN IN ER  Medications   aluminum-magnesium hydroxide-simethicone (MAALOX MAX) 400-400-40 MG/5ML suspension 15 mL (15 mL Oral Given 7/2/18 1418)   lidocaine viscous (XYLOCAINE) 2 % mouth solution 15 mL (15 mL Mouth/Throat Given 7/2/18 1418)   piperacillin-tazobactam (ZOSYN) 4.5 g in iso-osmotic dextrose 100 mL IVPB (premix) (0 g Intravenous Stopped 7/2/18 1644)                   PROGRESS AND CONSULTS  ED Course as of Jul 02 1729   Mon Jul 02, 2018   1110 Pt reports awaking with severe epigastric pain this am, took ibuprofen and now much improved. 2/10 currently. Had similar episode 1 week ago that improved with muscle relaxer. States drank ETOH 2 days ago, no ETOH use prior to previous episodes. Infrequent use of NSAIDS. Denies fever, n/v/d, urinary symptoms.  Exam - no distress, RRR, lungs clear, abdomen soft with mild epigastric and RUQ tenderness.  [KA]   1342 Rechecked  patient. Pain still 2/10. Re-examined abdomen. Mild epigastric/RUQ tenderness. Offered GI cocktail, pt accepts.  [KA]      ED Course User Index  [KA] SANG Richards     2:32 PM:  Gallbladder US ordered for further evaluation. GI cocktail ordered to treat for abdominal discomfort.     3:06 PM:  Pt's gallbladder US shows cholecystitis - Zosyn ordered. Blood cultures ordered for further evaluation. Placed call to the general surgeon to discuss further course of care.     3:19 PM:  Rechecked pt. Informed pt that his gallbladder US shows acute cholecystitis. Need for pt's admission to the hospital for cholecystectomy. Pt agrees with plan. Decision time to admit: Now.     4:02 PM:  Discussed case with Dr. Lora, general surgeon. He will admit pt to a med/surg bed for cholecystectomy.         MEDICAL DECISION MAKING      MDM  Number of Diagnoses or Management Options  Cholecystitis:   Diagnosis management comments: Pt with cholecystitis. IV abx. Admit.        Amount and/or Complexity of Data Reviewed  Clinical lab tests: ordered and reviewed (WBC is 11.61.)  Tests in the radiology section of CPT®:  ordered and reviewed (Gallbladder US: Cholelithiasis with evidence of acute cholecystitis.)  Discussion of test results with the performing providers: yes (Gallbladder US results d/w radiologist.   )  Discuss the patient with other providers: yes (Case d/w Dr. Lora, general surgeon, who will admit pt to a med/surg bed.   )    Patient Progress  Patient progress: stable             DIAGNOSIS  Final diagnoses:   Cholecystitis         DISPOSITION  Pt admitted to med/surg.      ADMISSION    Discussed treatment plan and reason for admission with pt/family and admitting physician.  Pt/family voiced understanding of the plan for admission for further testing/treatment as needed.                 Latest Documented Vital Signs:  As of 4:04 PM  BP- 126/73 HR- 60 Temp- 98.3 °F (36.8 °C) (Tympanic) O2 sat- 100%        --  Documentation  "assistance provided by tami Tapia for Dr. Bob MD.  Information recorded by the scribe was done at my direction and has been verified and validated by me.          Hilda Tapia  07/02/18 1072       Juan Carlos Rojas II, MD  07/02/18 8027      Electronically signed by Juan Carlos Rojas II, MD at 7/2/2018 10:56 PM     Mahnaz Fritz, RN at 7/2/2018  5:00 PM        Attempted to call report. Placed on hold for five minutes. Will attempt to call again in 15 minutes.      Mahnaz Fritz, JOSE  07/02/18 0400      Electronically signed by Mahnaz Fritz, RN at 7/2/2018  5:13 PM       Hospital Medications (all)       Dose Frequency Start End    aluminum-magnesium hydroxide-simethicone (MAALOX MAX) 400-400-40 MG/5ML suspension 15 mL 15 mL Once 7/2/2018 7/2/2018    Sig - Route: Take 15 mL by mouth 1 (One) Time. - Oral    Cosign for Ordering: Accepted by Juan Carlos Rojas II, MD on 7/2/2018 11:02 PM    bupivacaine-EPINEPHrine (MARCAINE w/EPI) injection  As Needed 7/3/2018     Sig: As Needed.    famotidine (PEPCID) injection 20 mg 20 mg Once 7/3/2018 7/3/2018    Sig - Route: Infuse 2 mL into a venous catheter 1 (One) Time. - Intravenous    fentaNYL citrate (PF) (SUBLIMAZE) injection 50 mcg 50 mcg Every 10 Minutes PRN 7/3/2018     Sig - Route: Infuse 1 mL into a venous catheter Every 10 (Ten) Minutes As Needed for Severe Pain . - Intravenous    HYDROmorphone (DILAUDID) injection 0.5 mg (MAR Hold) ((MAR Hold) since 7/3/2018 11:56 AM) 0.5 mg Every 2 Hours PRN 7/2/2018 7/12/2018    Sig - Route: Infuse 0.5 mL into a venous catheter Every 2 (Two) Hours As Needed for Severe Pain . - Intravenous    Linked Group 1:  \"And\" Linked Group Details        iothalamate (CONRAY) injection  As Needed 7/3/2018     Sig: As Needed.    lactated ringers infusion 125 mL/hr Continuous 7/2/2018     Sig - Route: Infuse 125 mL/hr into a venous catheter Continuous. - Intravenous    lactated ringers infusion 9 mL/hr " "Continuous 7/3/2018     Sig - Route: Infuse 9 mL/hr into a venous catheter Continuous. - Intravenous    lidocaine PF 1% (XYLOCAINE) injection 0.5 mL 0.5 mL Once As Needed 7/3/2018     Sig - Route: Inject 0.5 mL as directed 1 (One) Time As Needed (IV Start). - Injection    lidocaine viscous (XYLOCAINE) 2 % mouth solution 15 mL 15 mL Once 7/2/2018 7/2/2018    Sig - Route: Apply 15 mL to the mouth or throat 1 (One) Time. - Mouth/Throat    Cosign for Ordering: Accepted by Juan Carlos Rojas II, MD on 7/2/2018 11:02 PM    midazolam (VERSED) injection 1 mg 1 mg Every 5 Minutes PRN 7/3/2018     Sig - Route: Infuse 1 mL into a venous catheter Every 5 (Five) Minutes As Needed for Anxiety (Max 4mg midazolam TOTAL). - Intravenous    Linked Group 2:  \"Or\" Linked Group Details        midazolam (VERSED) injection 2 mg 2 mg Every 5 Minutes PRN 7/3/2018     Sig - Route: Infuse 2 mL into a venous catheter Every 5 (Five) Minutes As Needed for Anxiety (Max 4mg midazolam TOTAL). - Intravenous    Linked Group 2:  \"Or\" Linked Group Details        naloxone (NARCAN) injection 0.4 mg (MAR Hold) ((MAR Hold) since 7/3/2018 11:56 AM) 0.4 mg Every 5 Minutes PRN 7/2/2018     Sig - Route: Infuse 1 mL into a venous catheter Every 5 (Five) Minutes As Needed for Respiratory Depression. - Intravenous    Linked Group 1:  \"And\" Linked Group Details        ondansetron (ZOFRAN) injection 4 mg (MAR Hold) ((MAR Hold) since 7/3/2018 11:56 AM) 4 mg Every 6 Hours PRN 7/2/2018     Sig - Route: Infuse 2 mL into a venous catheter Every 6 (Six) Hours As Needed for Nausea or Vomiting. - Intravenous    piperacillin-tazobactam (ZOSYN) 4.5 g in iso-osmotic dextrose 100 mL IVPB (premix) 4.5 g Once 7/2/2018 7/2/2018    Sig - Route: Infuse 100 mL into a venous catheter 1 (One) Time. - Intravenous    sodium chloride (NS) irrigation solution  As Needed 7/3/2018     Sig: As Needed.    sodium chloride 0.9 % flush 1-10 mL (MAR Hold) ((MAR Hold) since 7/3/2018 11:56 AM) 1-10 " mL As Needed 7/2/2018     Sig - Route: Infuse 1-10 mL into a venous catheter As Needed for Line Care. - Intravenous    sodium chloride 0.9 % flush 1-10 mL 1-10 mL As Needed 7/3/2018     Sig - Route: Infuse 1-10 mL into a venous catheter As Needed for Line Care. - Intravenous    sodium chloride 0.9 % solution  As Needed 7/3/2018     Sig: As Needed.    aluminum-magnesium hydroxide-simethicone (MAALOX MAX) 400-400-40 MG/5ML suspension 15 mL (Discontinued) 15 mL Once 7/2/2018 7/2/2018    Sig - Route: Take 15 mL by mouth 1 (One) Time. - Oral    lidocaine viscous (XYLOCAINE) 2 % mouth solution 15 mL (Discontinued) 15 mL Once 7/2/2018 7/2/2018    Sig - Route: Apply 15 mL to the mouth or throat 1 (One) Time. - Mouth/Throat          Operative/Procedure Notes (all)     No notes of this type exist for this encounter.        Physician Progress Notes (last 72 hours) (Notes from 6/30/2018  2:22 PM through 7/3/2018  2:22 PM)     No notes of this type exist for this encounter.        Consult Notes (last 72 hours) (Notes from 6/30/2018  2:22 PM through 7/3/2018  2:22 PM)     No notes of this type exist for this encounter.

## 2018-07-03 NOTE — ANESTHESIA POSTPROCEDURE EVALUATION
"Patient: Tyrone Carvajal    Procedure Summary     Date:  07/03/18 Room / Location:  [facility] OR [address] [place] [address] MAIN OR    Anesthesia Start:  1257 Anesthesia Stop:  1428    Procedure:  CHOLECYSTECTOMY LAPAROSCOPIC WITH INTRAOPERATIVE CHOLANGIOGRAM (N/A Abdomen) Diagnosis:       Cholecystitis      (Cholecystitis [K81.9])    Surgeon:  Kofi Lora Jr., MD Provider:  Adelso Paige MD    Anesthesia Type:  general ASA Status:  2          Anesthesia Type: general  Last vitals  BP   103/49 (07/03/18 1428)   Temp   36.6 °C (97.8 °F) (07/03/18 1428)   Pulse   59 (07/03/18 1428)   Resp   14 (07/03/18 1428)     SpO2   99 % (07/03/18 1428)     Post Anesthesia Care and Evaluation    Patient location during evaluation: PACU  Patient participation: complete - patient participated  Level of consciousness: awake and alert  Pain management: adequate  Airway patency: patent  Anesthetic complications: No anesthetic complications    Cardiovascular status: acceptable  Respiratory status: acceptable  Hydration status: acceptable    Comments: /49 (BP Location: Left arm, Patient Position: Lying)   Pulse 59   Temp 36.6 °C (97.8 °F) (Oral)   Resp 14   Ht 177.8 cm (70\")   Wt 74.8 kg (164 lb 14.5 oz)   SpO2 99%   BMI 23.66 kg/m²       "

## 2018-07-03 NOTE — ANESTHESIA PROCEDURE NOTES
Airway  Urgency: elective    Airway not difficult    General Information and Staff    Patient location during procedure: OR  Anesthesiologist: JOSUÉ KEENE  CRNA: JONATHAN MORAES    Indications and Patient Condition  Indications for airway management: airway protection    Preoxygenated: yes  MILS maintained throughout  Mask difficulty assessment: 1 - vent by mask    Final Airway Details  Final airway type: endotracheal airway      Successful airway: ETT  Cuffed: yes   Successful intubation technique: direct laryngoscopy  Endotracheal tube insertion site: oral  Blade: Derik  Blade size: #3  ETT size: 8.0 mm  Cormack-Lehane Classification: grade I - full view of glottis  Placement verified by: chest auscultation and capnometry   Inital cuff pressure (cm H2O): 22  Cuff volume (mL): 8  Measured from: lips  ETT to lips (cm): 24  Number of attempts at approach: 1

## 2018-07-03 NOTE — PLAN OF CARE
Problem: Patient Care Overview  Goal: Plan of Care Review  Outcome: Ongoing (interventions implemented as appropriate)   07/03/18 0627   Plan of Care Review   Progress no change   OTHER   Outcome Summary VSS, consent signed for gallbladder surgery today, no c/o pain/nausea this shift, rested well     Goal: Individualization and Mutuality  Outcome: Ongoing (interventions implemented as appropriate)    Goal: Discharge Needs Assessment  Outcome: Ongoing (interventions implemented as appropriate)    Goal: Interprofessional Rounds/Family Conf  Outcome: Ongoing (interventions implemented as appropriate)      Problem: Pain, Acute (Adult)  Goal: Identify Related Risk Factors and Signs and Symptoms  Outcome: Ongoing (interventions implemented as appropriate)    Goal: Acceptable Pain Control/Comfort Level  Outcome: Ongoing (interventions implemented as appropriate)

## 2018-07-03 NOTE — ANESTHESIA PREPROCEDURE EVALUATION
Anesthesia Evaluation     Patient summary reviewed and Nursing notes reviewed   NPO Solid Status: > 8 hours  NPO Liquid Status: > 8 hours           Airway   Mallampati: II  Neck ROM: full  No difficulty expected  Dental - normal exam     Pulmonary     breath sounds clear to auscultation  Cardiovascular     Rhythm: regular        Neuro/Psych  GI/Hepatic/Renal/Endo      Musculoskeletal     Abdominal    Substance History      OB/GYN          Other                        Anesthesia Plan    ASA 2     general     intravenous induction   Anesthetic plan and risks discussed with patient.

## 2018-07-03 NOTE — OP NOTE
Surgeon: Kofi Lora Jr., M.D.    Assistant: Mei Giles PA-C    Pre-Operative Diagnosis:     Cholecystitis [K81.9]    Post-Operative Diagnosis:    Post-Op Diagnosis Codes:     * Cholecystitis [K81.9]    Procedure Performed:     Laparoscopic cholecystectomy with intraoperative cholangiogram    Indications:     The patient is a very pleasant 28-year-old white male who presented to the emergency room with severe epigastric and right upper quadrant abdominal pain.  Right upper quadrant ultrasound showed gallstones with evidence of acute cholecystitis.  He presents for laparoscopic cholecystectomy with intraoperative cholangiogram.  The patient understands the indications, alternatives, risks, and benefits of the procedure and wishes to proceed.    Procedure:     The patient was identified and taken to the operating room where he was placed in the supine position on the operating table.  Monitors were placed and he underwent general endotracheal anesthesia and was appropriately monitored throughout the case by the anesthesia personnel.  The abdomen was prepped and draped in the standard surgical fashion.  Each incision was infiltrated with 0.5% Marcaine with epinephrine prior to making the incision.  A supraumbilical incision was made and carried through the skin into the subcutaneous tissue.  The abdominal wall was elevated with skin hooks and a Veress needle was inserted through the incision into the abdomen without difficulty.  The abdomen was then insufflated with low pressures encountered initially.  Once fully insufflated, the Veress needle was removed and a 5 mm port was placed in the same incision, again with traction applied to the abdominal wall using skin hooks.  The laparoscope was inserted and the area of Veress needle and port insertion was inspected, no injury had occurred.  Attention was then turned to the upper abdomen.  A 10 mm subxiphoid port was placed by making skin incision carried through  the skin into the subcutaneous tissue and inserting the port through the incision into the abdomen with direct visualization using the laparoscope.  Two 5 mm ports were placed in the right upper quadrant in the same fashion.  The liver edge was elevated and the gallbladder was visualized.  The gallbladder had a thickened wall and pericholecystic fluid consistent with acute cholecystitis.  The gallbladder was grasped and elevated over the liver.  Adhesions were not present.  The gallbladder was then grasped at the infundibulum and traction was applied to open the triangle of Calot.  The triangle of Calot was carefully and meticulously dissected.  The cystic duct was identified and surrounded.  A clip was placed at the infundibulum cystic duct junction.  A cholangiocatheter was introduced into the abdomen through an Angiocath.  A small incision was created in the cystic duct with scissors and the cholangiocatheter was inserted into the cystic duct.  It was secured with a clip.  A cholangiogram was then performed that confirmed our impression of the anatomy, showed a normal size common bile duct with no filling defects, and rapid drainage of contrast material into the duodenum.  There was extravasation at the cystic duct site.  The cholangiocatheter was removed and the cystic duct was divided after placing 2 clips proximally and dividing with the scissors.  The cystic artery was also identified, surrounded, and divided after placing 2 clips proximally and dividing distally using Bovie electrocautery.  The gallbladder was removed from the gallbladder fossa using Bovie electrocautery.  It was then passed out of the abdomen through the epigastric port site in an Endo Catch bag.  The gallbladder fossa was carefully inspected and the be hemostatic with no evidence of a bile leak.  The abdomen was deflated and all ports were removed.  The fascia of the subxiphoid port was closed with an 0 vicryl suture in a figure-of-eight  fashion.  All skin incisions were closed with a 4-0 Monocryl in a subcuticular fashion followed by benzoin and Steri-Strips.  The sponge, needle, and instrument counts were correct at the end of the case.  The patient tolerated the procedure well and was transferred to the recovery room in stable condition.    Estimated Blood Loss:      minimal    Specimens:       Order Name Source Comment Collection Info Order Time   TISSUE PATHOLOGY EXAM Gallbladder  Collected By: Kofi Lora Jr., MD 7/3/2018  2:01 PM       Kofi Lora Jr., M.D.

## 2018-07-03 NOTE — PLAN OF CARE
Problem: Patient Care Overview  Goal: Plan of Care Review  Outcome: Ongoing (interventions implemented as appropriate)   07/03/18 1945   Plan of Care Review   Progress no change   OTHER   Outcome Summary Pt back to [facility] for post op lap vivek. Pt tolerating full liquid diet and acknowledged understanding about advancing diet as appropriate. Percocet given once and is well controlling pain. Family at bedside and supportive. Will continue to monitor.    Coping/Psychosocial   Plan of Care Reviewed With patient;family     Goal: Individualization and Mutuality  Outcome: Ongoing (interventions implemented as appropriate)    Goal: Discharge Needs Assessment  Outcome: Ongoing (interventions implemented as appropriate)    Goal: Interprofessional Rounds/Family Conf  Outcome: Ongoing (interventions implemented as appropriate)      Problem: Pain, Acute (Adult)  Goal: Identify Related Risk Factors and Signs and Symptoms  Outcome: Outcome(s) achieved Date Met: 07/03/18    Goal: Acceptable Pain Control/Comfort Level  Outcome: Ongoing (interventions implemented as appropriate)

## 2018-07-04 VITALS
BODY MASS INDEX: 23.61 KG/M2 | OXYGEN SATURATION: 98 % | HEIGHT: 70 IN | TEMPERATURE: 97 F | SYSTOLIC BLOOD PRESSURE: 106 MMHG | RESPIRATION RATE: 16 BRPM | WEIGHT: 164.9 LBS | DIASTOLIC BLOOD PRESSURE: 51 MMHG | HEART RATE: 53 BPM

## 2018-07-04 RX ORDER — OXYCODONE HYDROCHLORIDE AND ACETAMINOPHEN 5; 325 MG/1; MG/1
TABLET ORAL
Qty: 20 TABLET | Refills: 0 | OUTPATIENT
Start: 2018-07-04 | End: 2020-12-11

## 2018-07-04 RX ADMIN — OXYCODONE HYDROCHLORIDE AND ACETAMINOPHEN 2 TABLET: 5; 325 TABLET ORAL at 01:35

## 2018-07-04 RX ADMIN — OXYCODONE HYDROCHLORIDE AND ACETAMINOPHEN 2 TABLET: 5; 325 TABLET ORAL at 09:07

## 2018-07-04 NOTE — PROGRESS NOTES
Case Management Discharge Note    Final Note: Discharged  7/4/18     Destination     No service has been selected for the patient.      Durable Medical Equipment     No service has been selected for the patient.      Dialysis/Infusion     No service has been selected for the patient.      Home Medical Care     No service has been selected for the patient.      Social Care     No service has been selected for the patient.        Other: Other (private auto)    Final Discharge Disposition Code: 01 - home or self-care

## 2018-07-04 NOTE — DISCHARGE SUMMARY
Discharge Summary    Date of admission: 7/2/2018    Date of discharge: 7/4/2018    Final diagnosis:    1.  Cholelithiasis with acute cholecystitis    Procedures performed during admission:    1.  Laparoscopic cholecystectomy with intraoperative cholangiogram on 7/3/2018    Reason for admission:    The patient is a pleasant 28-year-old male who presented to the emergency room with severe epigastric and right upper quadrant abdominal pain.  Right upper quadrant ultrasound showed gallstones and acute cholecystitis.  He was admitted for further management.    Hospital course:    Patient was admitted on 7/2/2018 and taken to the operating room on 7/3/2018 for a laparoscopic cholecystectomy with intraoperative cholangiogram.  He was found to have acute cholecystitis with multiple gallstones.  He was transferred to the floor postoperatively and did quite well.  He was started on a diet which was advanced and he tolerated this well.  By 7/4/2018 he was afebrile and hemodynamically stable.  He was tolerating a diet.  He was up and a bili without difficulty.  His abdomen was soft and appropriately tender for the postop period.  His incisions are intact and healing well.  He is discharged to home with a prescription for Percocet.  He will follow-up with me in 2 weeks.

## 2018-07-04 NOTE — DISCHARGE INSTR - APPOINTMENTS
Guido Alvarenga, Kofi   [facility]   [address]  [address]  [place] [address]   P: [phone]   F: [phone]    You need to call and set up an apt with Dr. Lora in 2 weeks**

## 2018-07-04 NOTE — PLAN OF CARE
Problem: Patient Care Overview  Goal: Plan of Care Review  Outcome: Ongoing (interventions implemented as appropriate)   07/04/18 0530   Plan of Care Review   Progress improving   OTHER   Outcome Summary up ad fernie, little to no complaints of pain, steri-strips in place on abdomen, tolerating a full liquid diet, plans for home today   Coping/Psychosocial   Plan of Care Reviewed With patient     Goal: Individualization and Mutuality  Outcome: Ongoing (interventions implemented as appropriate)    Goal: Discharge Needs Assessment  Outcome: Ongoing (interventions implemented as appropriate)      Problem: Cholecystectomy (Adult)  Goal: Signs and Symptoms of Listed Potential Problems Will be Absent, Minimized or Managed (Cholecystectomy)  Outcome: Ongoing (interventions implemented as appropriate)    Goal: Anesthesia/Sedation Recovery  Outcome: Ongoing (interventions implemented as appropriate)

## 2018-07-05 LAB
CYTO UR: NORMAL
LAB AP CASE REPORT: NORMAL
PATH REPORT.FINAL DX SPEC: NORMAL
PATH REPORT.GROSS SPEC: NORMAL

## 2021-04-16 ENCOUNTER — BULK ORDERING (OUTPATIENT)
Dept: CASE MANAGEMENT | Facility: OTHER | Age: 31
End: 2021-04-16

## 2021-04-16 DIAGNOSIS — Z23 IMMUNIZATION DUE: ICD-10-CM

## 2024-10-10 ENCOUNTER — HOSPITAL ENCOUNTER (EMERGENCY)
Facility: HOSPITAL | Age: 34
Discharge: HOME | End: 2024-10-10
Attending: EMERGENCY MEDICINE
Payer: OTHER GOVERNMENT

## 2024-10-10 VITALS
WEIGHT: 180 LBS | RESPIRATION RATE: 18 BRPM | DIASTOLIC BLOOD PRESSURE: 82 MMHG | BODY MASS INDEX: 25.2 KG/M2 | OXYGEN SATURATION: 98 % | TEMPERATURE: 97.7 F | SYSTOLIC BLOOD PRESSURE: 138 MMHG | HEIGHT: 71 IN | HEART RATE: 83 BPM

## 2024-10-10 DIAGNOSIS — L73.9 FOLLICULITIS OF RIGHT AXILLA: Primary | ICD-10-CM

## 2024-10-10 PROCEDURE — 99282 EMERGENCY DEPT VISIT SF MDM: CPT

## 2024-10-10 PROCEDURE — 36415 COLL VENOUS BLD VENIPUNCTURE: CPT

## 2024-10-10 NOTE — ED PROVIDER NOTES
EMERGENCY DEPARTMENT ENCOUNTER  Room Number:  S04/04  PCP: Provider, No Known  Independent Historians: Patient      HPI:  Chief Complaint: had concerns including Wound Check.     A complete HPI/ROS/PMH/PSH/SH/FH are unobtainable due to: None    Chronic or social conditions impacting patient care (Social Determinants of Health): None      Context: The patient is a 34 y.o. male with a medical history of cholecystitis who presents to the ED c/o acute painful sores in his right axilla.  He states he developed something similar about a month ago, took a course of antibiotics and it improved.  He started again a few days ago, was evaluated somewhere yesterday and the culture from his previous episode was positive for MRSA.  He was prescribed doxycycline yesterday, has had 3 doses.  He still has pain and swelling in the area.  Denies any fevers or chills nausea or vomiting.  Is not diabetic.      Review of prior external notes (non-ED) -and- Review of prior external test results outside of this encounter:  Evaluated on 9/4/2020 for for swollen area on the right axilla.  Diagnosed with an abscess, prescribed Bactrim.  Evaluated yesterday at [facility], diagnosed with folliculitis, prescribed doxycycline and Bactroban        PAST MEDICAL HISTORY  Active Ambulatory Problems     Diagnosis Date Noted    Cholecystitis 07/02/2018     Resolved Ambulatory Problems     Diagnosis Date Noted    No Resolved Ambulatory Problems     No Additional Past Medical History         PAST SURGICAL HISTORY  Past Surgical History:   Procedure Laterality Date    CHOLECYSTECTOMY WITH INTRAOPERATIVE CHOLANGIOGRAM N/A 7/3/2018    Procedure: CHOLECYSTECTOMY LAPAROSCOPIC WITH INTRAOPERATIVE CHOLANGIOGRAM;  Surgeon: Kofi Lora Jr., MD;  Location: [facility];  Service: General         FAMILY HISTORY  No family history on file.      SOCIAL HISTORY  Social History     Socioeconomic History    Marital status: Single   Tobacco Use    Smoking  status: Never   Substance and Sexual Activity    Alcohol use: Yes     Comment: twice weekly    Drug use: No         ALLERGIES  Patient has no known allergies.      REVIEW OF SYSTEMS  Review of Systems  Included in HPI  All systems reviewed and negative except for those discussed in HPI.      PHYSICAL EXAM    I have reviewed the triage vital signs and nursing notes.    ED Triage Vitals   Temp Heart Rate Resp BP SpO2   10/10/24 1310 10/10/24 1310 10/10/24 1310 10/10/24 1312 10/10/24 1310   97.7 °F (36.5 °C) 96 18 138/82 98 %      Temp src Heart Rate Source Patient Position BP Location FiO2 (%)   10/10/24 1310 10/10/24 1310 10/10/24 1312 10/10/24 1312 --   Tympanic Monitor Sitting Right arm        Physical Exam  GENERAL: ***alert, no acute distress  SKIN: Warm, dry  HENT: Normocephalic, atraumatic  EYES: no scleral icterus  CV: regular rhythm, regular rate  RESPIRATORY: normal effort, lungs clear  ABDOMEN: nondistended  MUSCULOSKELETAL: no deformity  NEURO: alert, moves all extremities, follows commands        {EM_PPE (Optional):35781}    LAB RESULTS  No results found for this or any previous visit (from the past 24 hour(s)).      RADIOLOGY  No Radiology Exams Resulted Within Past 24 Hours      MEDICATIONS GIVEN IN ER  Medications - No data to display      ORDERS PLACED DURING THIS VISIT:  No orders of the defined types were placed in this encounter.        OUTPATIENT MEDICATION MANAGEMENT:  No current Epic-ordered facility-administered medications on file.     No current [facility]-ordered outpatient medications on file.         PROCEDURES  Procedures      {EM_CC (Optional):52147}      PROGRESS, DATA ANALYSIS, CONSULTS, AND MEDICAL DECISION MAKING  All labs have been independently interpreted by me.  All radiology studies have been reviewed by me. All EKG's have been independently viewed and interpreted by me.  Discussion below represents my analysis of pertinent findings related to patient's condition, differential  diagnosis, treatment plan and final disposition.    DIFFERENTIAL    ***    Clinical Scores:                             AS OF 13:45 EDT VITALS:    BP - 138/82  HR - 83  TEMP - 97.7 °F (36.5 °C) (Tympanic)  O2 SATS - 98%    COMPLEXITY OF CARE  {Admission Statement:60133}      DIAGNOSIS  Final diagnoses:   None         DISPOSITION  ED Disposition       None               FOLLOW UP  No follow-up provider specified.      Prescribed Medications     Medication List      No changes were made to your prescriptions during this visit.                   Please note that portions of this document were completed with a voice recognition program.    Note Disclaimer: At [facility], we believe that sharing information builds trust and better relationships. You are receiving this note because you recently visited [facility]. It is possible you will see health information before a provider has talked with you about it. This kind of information can be easy to misunderstand. To help you fully understand what it means for your health, we urge you to discuss this note with your provider.       discuss this note with your provider.         Lily Sanchez PA-C  10/15/24 1523

## 2024-10-10 NOTE — DISCHARGE INSTRUCTIONS
Clean the area once daily with the soap you were given  Warm moist compresses 3-4 times daily for 10 minutes each to the area.  Use a clean cloth each time.  Continue the antibiotics until completed    Return to the ER if fever, worsening symptoms, any concerns

## 2024-10-10 NOTE — ED PROVIDER NOTES
MD ATTESTATION NOTE     SHARED VISIT: This visit was performed by BOTH a physician and an APC. The substantive portion of the medical decision making was performed by this attesting physician who made or approved the management plan and takes responsibility for patient management. All studies in the APC note (if performed) were independently interpreted by me.  The AD and I have discussed this patient's history, physical exam, and treatment plan. I have reviewed the documentation and personally had a face to face interaction with the patient. I affirm the documentation and agree with the treatment and plan. I provided a substantive portion of the care of the patient.  I personally performed the physical exam in its entirety, and below are my findings.      Brief HPI: Patient complains of painful sores in his right axilla.  Symptoms began several days ago.  He was started on doxycycline yesterday.  He had a similar episode about a month ago that resolved with antibiotics.  Denies fever or chills.  He does not have a history of diabetes.    PHYSICAL EXAM    GENERAL: Awake, alert, oriented x 3.  Well-developed, well-nourished and nontoxic-appearing male.  Resting comfortably in no acute distress  HENT: nares patent  EYES: no scleral icterus  CV: regular rhythm, normal rate  RESPIRATORY: normal effort, CTAB  ABDOMEN: soft  MUSCULOSKELETAL: no deformity  NEURO: alert, moves all extremities, follows commands  PSYCH:  calm, cooperative  SKIN: There are several small erythematous tender pustules in the right axilla.    Vital signs and nursing notes reviewed.        Plan: Patient has several sores in his right axilla.  None of these are large enough for incision and drainage.  He will be continued on antibiotics.       Boby Tirado MD  10/10/24 4405

## (undated) DEVICE — DRP C/ARM 41X74IN

## (undated) DEVICE — ENDOPATH XCEL BLADELESS TROCARS WITH STABILITY SLEEVES: Brand: ENDOPATH XCEL

## (undated) DEVICE — ENDOPATH PNEUMONEEDLE INSUFFLATION NEEDLES WITH LUER LOCK CONNECTORS 120MM: Brand: ENDOPATH

## (undated) DEVICE — 3M™ STERI-STRIP™ COMPOUND BENZOIN TINCTURE 40 BAGS/CARTON 4 CARTONS/CASE C1544: Brand: 3M™ STERI-STRIP™

## (undated) DEVICE — CATH IV INSYTE AUTOGARD 14G 1 1/2IN ORNG

## (undated) DEVICE — ENDOPOUCH RETRIEVER SPECIMEN RETRIEVAL BAGS: Brand: ENDOPOUCH RETRIEVER

## (undated) DEVICE — DRAPE,REIN 53X77,STERILE: Brand: MEDLINE

## (undated) DEVICE — LOU LAP CHOLE: Brand: MEDLINE INDUSTRIES, INC.

## (undated) DEVICE — CATH CHOLANG 4.5F18IN BRGNDY

## (undated) DEVICE — STPCK 3WY D201 DISCOFIX

## (undated) DEVICE — APPL CHLORAPREP W/TINT 26ML ORNG

## (undated) DEVICE — ENCORE® LATEX ORTHO SIZE 7.5, STERILE LATEX POWDER-FREE SURGICAL GLOVE: Brand: ENCORE

## (undated) DEVICE — EXTENSION SET, MALE LUER LOCK ADAPTER WITH RETRACTABLE COLLAR

## (undated) DEVICE — SOL NACL 0.9PCT 1000ML

## (undated) DEVICE — SUT MNCRYL PLS ANTIB UD 4/0 PS2 18IN

## (undated) DEVICE — CONTAINER,SPECIMEN,OR STERILE,4OZ: Brand: MEDLINE

## (undated) DEVICE — 3M™ STERI-STRIP™ REINFORCED ADHESIVE SKIN CLOSURES, R1547, 1/2 IN X 4 IN (12 MM X 100 MM), 6 STRIPS/ENVELOPE: Brand: 3M™ STERI-STRIP™

## (undated) DEVICE — ENDOCUT SCISSOR TIP, DISPOSABLE: Brand: RENEW

## (undated) DEVICE — ENDOPATH XCEL UNIVERSAL TROCAR STABLILITY SLEEVES: Brand: ENDOPATH XCEL

## (undated) DEVICE — SUT VIC 0 TN 27IN DYED JTN0G